# Patient Record
(demographics unavailable — no encounter records)

---

## 2017-04-20 DIAGNOSIS — M35.01 KERATITIS SICCA, BILATERAL: ICD-10-CM

## 2017-04-20 RX ORDER — CYCLOSPORINE 0.5 MG/ML
1 EMULSION OPHTHALMIC 2 TIMES DAILY
Qty: 180 VIAL | Refills: 4 | Status: SHIPPED | OUTPATIENT
Start: 2017-04-20

## 2017-04-20 NOTE — TELEPHONE ENCOUNTER
----- Message from Paula Alexandra sent at 4/20/2017 10:20 AM CDT -----  Contact: [facility] mail in order  RESTASIS 0.05 % need refill pls call [phone]